# Patient Record
Sex: FEMALE | Race: BLACK OR AFRICAN AMERICAN | NOT HISPANIC OR LATINO | URBAN - METROPOLITAN AREA
[De-identification: names, ages, dates, MRNs, and addresses within clinical notes are randomized per-mention and may not be internally consistent; named-entity substitution may affect disease eponyms.]

---

## 2020-10-08 ENCOUNTER — OFFICE VISIT (OUTPATIENT)
Dept: URBAN - METROPOLITAN AREA CLINIC 118 | Facility: CLINIC | Age: 40
End: 2020-10-08
Payer: COMMERCIAL

## 2020-10-08 ENCOUNTER — LAB OUTSIDE AN ENCOUNTER (OUTPATIENT)
Dept: URBAN - METROPOLITAN AREA CLINIC 118 | Facility: CLINIC | Age: 40
End: 2020-10-08

## 2020-10-08 DIAGNOSIS — R10.9 ABDOMINAL PAIN: ICD-10-CM

## 2020-10-08 DIAGNOSIS — R11.0 NAUSEA: ICD-10-CM

## 2020-10-08 PROCEDURE — 99205 OFFICE O/P NEW HI 60 MIN: CPT | Performed by: INTERNAL MEDICINE

## 2020-10-08 RX ORDER — AMITRIPTYLINE HYDROCHLORIDE 100 MG/1
1 TABLET AT BEDTIME TABLET, FILM COATED ORAL ONCE A DAY
Status: ACTIVE | COMMUNITY

## 2020-10-08 RX ORDER — MECLIZINE HCL 25MG 25 MG/1
1 TABLET AS NEEDED TABLET, CHEWABLE ORAL ONCE A DAY
Status: ACTIVE | COMMUNITY

## 2020-10-08 RX ORDER — TIZANIDINE 4 MG/1
1 TABLET AS NEEDED TABLET ORAL THREE TIMES A DAY
Status: ACTIVE | COMMUNITY

## 2020-10-08 RX ORDER — PROMETHAZINE HYDROCHLORIDE 25 MG/1
1 SUPPOSITORY AS NEEDED SUPPOSITORY RECTAL
Status: ACTIVE | COMMUNITY

## 2020-10-08 RX ORDER — DIPHENOXYLATE HYDROCHLORIDE AND ATROPINE SULFATE 2.5; .025 MG/1; MG/1
1 TABLET AS NEEDED TABLET ORAL THREE TIMES A DAY
Qty: 90 TABLET | Refills: 5 | OUTPATIENT
Start: 2020-10-08 | End: 2021-04-06

## 2020-10-08 RX ORDER — GALCANEZUMAB 120 MG/ML
AS DIRECTED INJECTION, SOLUTION SUBCUTANEOUS
Status: ACTIVE | COMMUNITY

## 2020-10-08 RX ORDER — RIZATRIPTAN BENZOATE 10 MG/1
1 TABLET TABLET ORAL ONCE A DAY
Status: ACTIVE | COMMUNITY

## 2020-10-08 RX ORDER — FAMOTIDINE 20 MG/1
1 TABLET AT BEDTIME AS NEEDED TABLET, FILM COATED ORAL ONCE A DAY
Status: ACTIVE | COMMUNITY

## 2020-10-08 RX ORDER — PROPRANOLOL HYDROCHLORIDE 60 MG/1
1 TABLET TABLET ORAL ONCE A DAY
Status: ACTIVE | COMMUNITY

## 2020-10-08 NOTE — HPI-TODAY'S VISIT:
pt s/p Nissen w/ continued reflux at 20 yo, h/o H. pylori in past, s/p gastric bypass 2013 but reports was told later that had necrotic stomach and had stomach 'totally removed' (told in 2016). Reports now loose stools along with hypoglycemia. Reports since then chronic diarrhea but was managed w/ diet (post-gastrectomy diet). Reports now recently going from constipation to diarrhea. Reports also epigastric pain with eating, H. pylori recently negative. Reports prescribed Bentyl but didn't start taking med. Reports nsaids prn

## 2020-10-09 ENCOUNTER — TELEPHONE ENCOUNTER (OUTPATIENT)
Dept: URBAN - METROPOLITAN AREA CLINIC 92 | Facility: CLINIC | Age: 40
End: 2020-10-09

## 2020-10-09 ENCOUNTER — LAB OUTSIDE AN ENCOUNTER (OUTPATIENT)
Dept: URBAN - METROPOLITAN AREA CLINIC 118 | Facility: CLINIC | Age: 40
End: 2020-10-09

## 2020-10-13 ENCOUNTER — OFFICE VISIT (OUTPATIENT)
Dept: URBAN - METROPOLITAN AREA TELEHEALTH 2 | Facility: TELEHEALTH | Age: 40
End: 2020-10-13

## 2020-10-13 NOTE — HPI-OTHER HISTORIES
Hx of obesity.  Family hx of morbid obesity.  Chronic migraines.  Used a lot of ibuprophen.  Bad reflux.  Nissen.  Failed Nissen.  She had bariatric surgery then had issues with diarrhea.  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  Chronic diarrhea.  She found out that she had full gastrectomy during bariatric surgery due to necrotic stomach.  Lost 70 pounds.  She can barely eat.  Having issues with hypoglycemia.

## 2020-10-20 ENCOUNTER — OFFICE VISIT (OUTPATIENT)
Dept: URBAN - METROPOLITAN AREA TELEHEALTH 2 | Facility: TELEHEALTH | Age: 40
End: 2020-10-20
Payer: COMMERCIAL

## 2020-10-20 DIAGNOSIS — E66.01 MORBID OBESITY: ICD-10-CM

## 2020-10-20 PROCEDURE — 97802 MEDICAL NUTRITION INDIV IN: CPT | Performed by: DIETITIAN, REGISTERED

## 2020-11-02 ENCOUNTER — OFFICE VISIT (OUTPATIENT)
Dept: URBAN - METROPOLITAN AREA SURGERY CENTER 23 | Facility: SURGERY CENTER | Age: 40
End: 2020-11-02

## 2020-11-12 ENCOUNTER — OFFICE VISIT (OUTPATIENT)
Dept: URBAN - METROPOLITAN AREA TELEHEALTH 2 | Facility: TELEHEALTH | Age: 40
End: 2020-11-12

## 2022-05-23 ENCOUNTER — OFFICE VISIT (OUTPATIENT)
Dept: URBAN - METROPOLITAN AREA CLINIC 118 | Facility: CLINIC | Age: 42
End: 2022-05-23

## 2022-05-23 ENCOUNTER — LAB OUTSIDE AN ENCOUNTER (OUTPATIENT)
Dept: URBAN - METROPOLITAN AREA CLINIC 118 | Facility: CLINIC | Age: 42
End: 2022-05-23

## 2022-05-23 ENCOUNTER — DASHBOARD ENCOUNTERS (OUTPATIENT)
Age: 42
End: 2022-05-23

## 2022-05-23 ENCOUNTER — OFFICE VISIT (OUTPATIENT)
Dept: URBAN - METROPOLITAN AREA CLINIC 118 | Facility: CLINIC | Age: 42
End: 2022-05-23
Payer: COMMERCIAL

## 2022-05-23 DIAGNOSIS — K62.5 RECTAL BLEEDING: ICD-10-CM

## 2022-05-23 DIAGNOSIS — R10.9 ABDOMINAL PAIN: ICD-10-CM

## 2022-05-23 DIAGNOSIS — R11.0 NAUSEA: ICD-10-CM

## 2022-05-23 DIAGNOSIS — R63.4 WEIGHT LOSS: ICD-10-CM

## 2022-05-23 PROCEDURE — 99214 OFFICE O/P EST MOD 30 MIN: CPT | Performed by: INTERNAL MEDICINE

## 2022-05-23 RX ORDER — PROMETHAZINE HYDROCHLORIDE 25 MG/1
1 SUPPOSITORY AS NEEDED SUPPOSITORY RECTAL
Status: ACTIVE | COMMUNITY

## 2022-05-23 RX ORDER — MECLIZINE HCL 25MG 25 MG/1
1 TABLET AS NEEDED TABLET, CHEWABLE ORAL ONCE A DAY
Status: ACTIVE | COMMUNITY

## 2022-05-23 RX ORDER — GALCANEZUMAB 120 MG/ML
AS DIRECTED INJECTION, SOLUTION SUBCUTANEOUS
Status: DISCONTINUED | COMMUNITY

## 2022-05-23 RX ORDER — RIZATRIPTAN BENZOATE 10 MG/1
1 TABLET TABLET ORAL ONCE A DAY
Status: DISCONTINUED | COMMUNITY

## 2022-05-23 RX ORDER — FAMOTIDINE 20 MG/1
1 TABLET AT BEDTIME AS NEEDED TABLET, FILM COATED ORAL ONCE A DAY
Status: ACTIVE | COMMUNITY

## 2022-05-23 RX ORDER — AMITRIPTYLINE HYDROCHLORIDE 100 MG/1
1 TABLET AT BEDTIME TABLET, FILM COATED ORAL ONCE A DAY
Status: ACTIVE | COMMUNITY

## 2022-05-23 RX ORDER — PROPRANOLOL HYDROCHLORIDE 60 MG/1
1 TABLET TABLET ORAL ONCE A DAY
Status: ACTIVE | COMMUNITY

## 2022-05-23 RX ORDER — TIZANIDINE 4 MG/1
1 TABLET AS NEEDED TABLET ORAL THREE TIMES A DAY
Status: ACTIVE | COMMUNITY

## 2022-05-23 NOTE — HPI-TODAY'S VISIT:
pt s/p gastric bypass, Nissen wrap, partial gastrectomy now presents for follow up. Pt notes h/o gerd now with nausea with or without eating. Pt also notes abominal discomfort with eating. No nsaids use. pt with noted gas, bloating and cramping. Noted occasional bowel changes as well as mild blood stool. Noted mild weight loss. No family h/o colon cancer. Pt has been taking Pepcid and Dicyclomine with noted reaction to Phenerghan and Zofran.

## 2022-06-24 ENCOUNTER — OFFICE VISIT (OUTPATIENT)
Dept: URBAN - METROPOLITAN AREA SURGERY CENTER 23 | Facility: SURGERY CENTER | Age: 42
End: 2022-06-24
Payer: COMMERCIAL

## 2022-06-24 DIAGNOSIS — K62.5 ANAL BLEEDING: ICD-10-CM

## 2022-06-24 DIAGNOSIS — R19.4 ALTERATION IN BOWEL ELIMINATION: ICD-10-CM

## 2022-06-24 DIAGNOSIS — K22.89 DILATATION OF ESOPHAGUS: ICD-10-CM

## 2022-06-24 DIAGNOSIS — R12 BURNING REFLUX: ICD-10-CM

## 2022-06-24 DIAGNOSIS — K22.2 ACQUIRED ESOPHAGEAL RING: ICD-10-CM

## 2022-06-24 PROCEDURE — 43239 EGD BIOPSY SINGLE/MULTIPLE: CPT | Performed by: INTERNAL MEDICINE

## 2022-06-24 PROCEDURE — 45380 COLONOSCOPY AND BIOPSY: CPT | Performed by: INTERNAL MEDICINE

## 2022-06-24 PROCEDURE — 43450 DILATE ESOPHAGUS 1/MULT PASS: CPT | Performed by: INTERNAL MEDICINE

## 2022-06-24 PROCEDURE — G8907 PT DOC NO EVENTS ON DISCHARG: HCPCS | Performed by: INTERNAL MEDICINE

## 2022-06-24 RX ORDER — TIZANIDINE 4 MG/1
1 TABLET AS NEEDED TABLET ORAL THREE TIMES A DAY
Status: ACTIVE | COMMUNITY

## 2022-06-24 RX ORDER — MECLIZINE HCL 25MG 25 MG/1
1 TABLET AS NEEDED TABLET, CHEWABLE ORAL ONCE A DAY
Status: ACTIVE | COMMUNITY

## 2022-06-24 RX ORDER — FAMOTIDINE 20 MG/1
1 TABLET AT BEDTIME AS NEEDED TABLET, FILM COATED ORAL ONCE A DAY
Status: ACTIVE | COMMUNITY

## 2022-06-24 RX ORDER — PROMETHAZINE HYDROCHLORIDE 25 MG/1
1 SUPPOSITORY AS NEEDED SUPPOSITORY RECTAL
Status: ACTIVE | COMMUNITY

## 2022-06-24 RX ORDER — PROPRANOLOL HYDROCHLORIDE 60 MG/1
1 TABLET TABLET ORAL ONCE A DAY
Status: ACTIVE | COMMUNITY

## 2022-06-24 RX ORDER — AMITRIPTYLINE HYDROCHLORIDE 100 MG/1
1 TABLET AT BEDTIME TABLET, FILM COATED ORAL ONCE A DAY
Status: ACTIVE | COMMUNITY